# Patient Record
(demographics unavailable — no encounter records)

---

## 2025-01-16 NOTE — HEALTH RISK ASSESSMENT
[No] : In the past 12 months have you used drugs other than those required for medical reasons? No [Little interest or pleasure doing things] : 1) Little interest or pleasure doing things [0] : 1) Little interest or pleasure doing things: Not at all (0) [Feeling down, depressed, or hopeless] : 2) Feeling down, depressed, or hopeless [1] : 2) Feeling down, depressed, or hopeless for several days (1) [PHQ-2 Negative - No further assessment needed] : PHQ-2 Negative - No further assessment needed [With Patient/Caregiver] : , with patient/caregiver [Reviewed no changes] : Reviewed, no changes [Designated Healthcare Proxy] : Designated healthcare proxy [Name: ___] : Health Care Proxy's Name: [unfilled]  [Relationship: ___] : Relationship: [unfilled] [Aggressive treatment] : aggressive treatment [I will adhere to the patient's wishes.] : I will adhere to the patient's wishes. [Never] : Never [Audit-CScore] : 0 [[id number]] : 1 [AdvancecareDate] : 01/25

## 2025-01-16 NOTE — REVIEW OF SYSTEMS
[Chest Pain] : chest pain [Palpitations] : palpitations [Cough] : cough [Dyspnea on Exertion] : dyspnea on exertion [Abdominal Pain] : abdominal pain [Nausea] : nausea [Vomiting] : vomiting [Dysuria] : dysuria [Nocturia] : nocturia [Frequency] : frequency [Joint Pain] : joint pain [Joint Stiffness] : joint stiffness [Joint Swelling] : joint swelling [Muscle Pain] : muscle pain [Back Pain] : back pain [Negative] : Heme/Lymph [Earache] : no earache [Hearing Loss] : no hearing loss [Nosebleed] : no nosebleeds [Nasal Discharge] : no nasal discharge [Sore Throat] : no sore throat [Leg Claudication] : no leg claudication [Lower Ext Edema] : no lower extremity edema [Orthopnea] : no orthopnea [Shortness Of Breath] : no shortness of breath [Wheezing] : no wheezing [Incontinence] : no incontinence [Hematuria] : no hematuria [Vaginal Discharge] : no vaginal discharge [FreeTextEntry5] : Patient describes chest pain localized right side of chest wall radiating to right arm [FreeTextEntry7] : Abdominal pain has resolved but it does going calm patient was diagnosed with cholelithiasis.

## 2025-01-16 NOTE — PHYSICAL EXAM
[No Acute Distress] : no acute distress [Well Nourished] : well nourished [Well Developed] : well developed [Well-Appearing] : well-appearing [Normal Voice/Communication] : normal voice/communication [Normal Sclera/Conjunctiva] : normal sclera/conjunctiva [PERRL] : pupils equal round and reactive to light [EOMI] : extraocular movements intact [Normal Outer Ear/Nose] : the outer ears and nose were normal in appearance [Normal Oropharynx] : the oropharynx was normal [No JVD] : no jugular venous distention [No Lymphadenopathy] : no lymphadenopathy [Supple] : supple [Thyroid Normal, No Nodules] : the thyroid was normal and there were no nodules present [No Respiratory Distress] : no respiratory distress  [No Accessory Muscle Use] : no accessory muscle use [Clear to Auscultation] : lungs were clear to auscultation bilaterally [Normal Rate] : normal rate  [Regular Rhythm] : with a regular rhythm [Normal S1, S2] : normal S1 and S2 [No Murmur] : no murmur heard [No Carotid Bruits] : no carotid bruits [No Abdominal Bruit] : a ~M bruit was not heard ~T in the abdomen [Pedal Pulses Present] : the pedal pulses are present [No Edema] : there was no peripheral edema [No Palpable Aorta] : no palpable aorta [No Extremity Clubbing/Cyanosis] : no extremity clubbing/cyanosis [Soft] : abdomen soft [No Masses] : no abdominal mass palpated [No HSM] : no HSM [Normal Bowel Sounds] : normal bowel sounds [No CVA Tenderness] : no CVA  tenderness [No Spinal Tenderness] : no spinal tenderness [No Joint Swelling] : no joint swelling [Grossly Normal Strength/Tone] : grossly normal strength/tone [No Rash] : no rash [Coordination Grossly Intact] : coordination grossly intact [No Focal Deficits] : no focal deficits [Normal Gait] : normal gait [Deep Tendon Reflexes (DTR)] : deep tendon reflexes were 2+ and symmetric [Speech Grossly Normal] : speech grossly normal [Memory Grossly Normal] : memory grossly normal [Normal Affect] : the affect was normal [Alert and Oriented x3] : oriented to person, place, and time [Normal Mood] : the mood was normal [Normal Insight/Judgement] : insight and judgment were intact [de-identified] : Varicose veins bilaterally now causing pain. [de-identified] : obese, right upper quadrant pain with deep palpation [de-identified] : L/S spasm [de-identified] : diffuse joint pain especially of back, right hip and right knee.  Right knee effusion patient is seen regularly by orthopedics.

## 2025-01-16 NOTE — COUNSELING

## 2025-01-16 NOTE — HISTORY OF PRESENT ILLNESS
[FreeTextEntry1] : Follow-up and disease management. [de-identified] : Patient is a 67-year-old female who presents today for follow-up and disease management.  Medical history is significant for hypertension, chronic pain syndrome which is multifactorial, gastroesophageal reflux disease, and history of fatty liver.  Patient was given prescription for blood work which she was going to do as an outpatient unfortunately patient never has gone to get her blood work done.

## 2025-01-16 NOTE — ASSESSMENT
[FreeTextEntry1] : Assessment and plan:  1.  Hypertension blood pressure is stable for patient tolerating medications amlodipine 10 mg daily and home blood pressure monitoring.  Today's blood pressure 127/78 which actually is good blood pressure control for patient.  2.  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  3.  Gastroesophageal reflux disease patient will continue proton pump inhibitor omeprazole prescription sent to pharmacy.  4.  Degenerative joint disease of right knee it has been recommended that patient have total knee replacement she did have arthroscopic surgery done several years ago.  5.  Health maintenance issues discussed with patient on multiple occasions I had recommended that the patient see her gynecologist it has been years since his last gynecological exam and she has never had a colonoscopy therefore at today's visit I referred patient to gynecology, bone density, mammogram, gastroenterology evaluation for colonoscopy.  Unfortunately up until now the patient has been totally noncompliant I discussed the risks with the patient patient voiced understanding she said that she has been a chicken up until now to have all the test done.  She said that she will make the necessary appointments.  That is questionable she also refused blood work at today's visit she said she is nonfasting and at last visit I did give her prescription for blood work and she has never gone.  6.  Reviewed all medications with patient reviewed most recent blood work total time spent face-to-face and non-face-to-face time 40 minutes the majority of which was spent on counseling and coordination of care.  Patient declines all vaccines at this time.

## 2025-07-03 NOTE — COUNSELING
[Fall prevention counseling provided] : Fall prevention counseling provided [Adequate lighting] : Adequate lighting [No throw rugs] : No throw rugs [Use proper foot wear] : Use proper foot wear [Use recommended devices] : Use recommended devices [Behavioral health counseling provided] : Behavioral health counseling provided [Sleep ___ hours/day] : Sleep [unfilled] hours/day [Engage in a relaxing activity] : Engage in a relaxing activity [Plan in advance] : Plan in advance [AUDIT-C Screening administered and reviewed] : AUDIT-C Screening administered and reviewed [Potential consequences of obesity discussed] : Potential consequences of obesity discussed [Benefits of weight loss discussed] : Benefits of weight loss discussed [Structured Weight Management Program suggested:] : Structured weight management program suggested [Encouraged to maintain food diary] : Encouraged to maintain food diary [Encouraged to increase physical activity] : Encouraged to increase physical activity [Encouraged to use exercise tracking device] : Encouraged to use exercise tracking device [Target Wt Loss Goal ___] : Weight Loss Goals: Target weight loss goal [unfilled] lbs [Weigh Self Weekly] : weigh self weekly [Decrease Portions] : decrease portions [____ min/wk Activity] : [unfilled] min/wk activity [Keep Food Diary] : keep food diary [Patient motivation] : Patient motivation [Good understanding] : Patient has a good understanding of lifestyle changes and steps needed to achieve self management goal

## 2025-07-03 NOTE — HEALTH RISK ASSESSMENT
[Never (0 pts)] : Never (0 points) [No] : In the past 12 months have you used drugs other than those required for medical reasons? No [Two or more falls in past year] : Patient reported two or more falls in the past year [Little interest or pleasure doing things] : 1) Little interest or pleasure doing things [Feeling down, depressed, or hopeless] : 2) Feeling down, depressed, or hopeless [0] : 2) Feeling down, depressed, or hopeless: Not at all (0) [With Patient/Caregiver] : , with patient/caregiver [Reviewed no changes] : Reviewed, no changes [Designated Healthcare Proxy] : Designated healthcare proxy [Relationship: ___] : Relationship: [unfilled] [Aggressive treatment] : aggressive treatment [I will adhere to the patient's wishes.] : I will adhere to the patient's wishes. [Never] : Never [Audit-CScore] : 0 [[id number]] : 0 [AdvancecareDate] : 07/25

## 2025-07-03 NOTE — HISTORY OF PRESENT ILLNESS
[FreeTextEntry1] : Follow-up and disease management. [de-identified] : Patient is a 67-year-old female who presents today for follow-up and disease management.  Medical history is significant for hypertension, chronic pain syndrome which is multifactorial, gastroesophageal reflux disease, and history of fatty liver.  Patient was given prescription for blood work which she was going to do as an outpatient unfortunately patient never has gone to get her blood work done.

## 2025-07-03 NOTE — ASSESSMENT
[FreeTextEntry1] : Assessment and plan:  1.  Hypertension blood pressure is stable for patient tolerating medications amlodipine 10 mg daily and home blood pressure monitoring.  Today's blood pressure 138/78 which actually is good blood pressure control for patient.  2.  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  3.  Gastroesophageal reflux disease patient will continue proton pump inhibitor omeprazole prescription sent to pharmacy.  4.  Degenerative joint disease of right knee it has been recommended that patient have total knee replacement she did have arthroscopic surgery done several years ago.  5.  Health maintenance issues discussed with patient on multiple occasions I had recommended that the patient see her gynecologist it has been years since his last gynecological exam and she has never had a colonoscopy therefore at today's visit I referred patient to gynecology, bone density, mammogram, gastroenterology evaluation for colonoscopy.  Unfortunately up until now the patient has been totally noncompliant I discussed the risks with the patient patient voiced understanding she said that she has been a chicken up until now to have all the test done.  She said that she will make the necessary appointments.  That is questionable she also refused blood work at today's visit she said she is nonfasting and at last visit I did give her prescription for blood work and she has never gone.  Patient states that she will be making her follow-up appointment in 3 months for annual wellness exam at that time I will obtain comprehensive blood work, urine analysis and electrocardiogram.  6.  Reviewed all medications with patient reviewed most recent blood work total time spent face-to-face and non-face-to-face time 40 minutes the majority of which was spent on counseling and coordination of care.  Patient declines all vaccines at this time.

## 2025-07-03 NOTE — PHYSICAL EXAM
[No Acute Distress] : no acute distress [Well Nourished] : well nourished [Well Developed] : well developed [Well-Appearing] : well-appearing [Normal Voice/Communication] : normal voice/communication [Normal Sclera/Conjunctiva] : normal sclera/conjunctiva [PERRL] : pupils equal round and reactive to light [EOMI] : extraocular movements intact [Normal Outer Ear/Nose] : the outer ears and nose were normal in appearance [Normal Oropharynx] : the oropharynx was normal [No JVD] : no jugular venous distention [No Lymphadenopathy] : no lymphadenopathy [Supple] : supple [Thyroid Normal, No Nodules] : the thyroid was normal and there were no nodules present [No Respiratory Distress] : no respiratory distress  [No Accessory Muscle Use] : no accessory muscle use [Clear to Auscultation] : lungs were clear to auscultation bilaterally [Normal Rate] : normal rate  [Regular Rhythm] : with a regular rhythm [Normal S1, S2] : normal S1 and S2 [No Murmur] : no murmur heard [No Carotid Bruits] : no carotid bruits [No Abdominal Bruit] : a ~M bruit was not heard ~T in the abdomen [Pedal Pulses Present] : the pedal pulses are present [No Edema] : there was no peripheral edema [No Palpable Aorta] : no palpable aorta [No Extremity Clubbing/Cyanosis] : no extremity clubbing/cyanosis [Soft] : abdomen soft [No Masses] : no abdominal mass palpated [No HSM] : no HSM [Normal Bowel Sounds] : normal bowel sounds [No CVA Tenderness] : no CVA  tenderness [No Spinal Tenderness] : no spinal tenderness [No Joint Swelling] : no joint swelling [Grossly Normal Strength/Tone] : grossly normal strength/tone [No Rash] : no rash [Coordination Grossly Intact] : coordination grossly intact [No Focal Deficits] : no focal deficits [Normal Gait] : normal gait [Deep Tendon Reflexes (DTR)] : deep tendon reflexes were 2+ and symmetric [Speech Grossly Normal] : speech grossly normal [Memory Grossly Normal] : memory grossly normal [Normal Affect] : the affect was normal [Alert and Oriented x3] : oriented to person, place, and time [Normal Mood] : the mood was normal [Normal Insight/Judgement] : insight and judgment were intact [de-identified] : Varicose veins bilaterally now causing pain. [de-identified] : obese, right upper quadrant pain with deep palpation [de-identified] : L/S spasm [de-identified] : diffuse joint pain especially of back, right hip and right knee.  Right knee effusion patient is seen regularly by orthopedics.